# Patient Record
Sex: MALE | Race: WHITE | NOT HISPANIC OR LATINO | Employment: OTHER | URBAN - METROPOLITAN AREA
[De-identification: names, ages, dates, MRNs, and addresses within clinical notes are randomized per-mention and may not be internally consistent; named-entity substitution may affect disease eponyms.]

---

## 2017-03-23 PROBLEM — N50.811 RIGHT TESTICULAR PAIN: Status: ACTIVE | Noted: 2017-03-23

## 2019-05-08 DIAGNOSIS — G30.9 ALZHEIMER'S DISEASE: Primary | ICD-10-CM

## 2019-05-08 DIAGNOSIS — F02.80 ALZHEIMER'S DISEASE: Primary | ICD-10-CM

## 2019-05-09 ENCOUNTER — TELEPHONE (OUTPATIENT)
Dept: RADIOLOGY | Facility: HOSPITAL | Age: 69
End: 2019-05-09

## 2019-05-14 ENCOUNTER — HOSPITAL ENCOUNTER (OUTPATIENT)
Dept: RADIOLOGY | Facility: HOSPITAL | Age: 69
Discharge: HOME | End: 2019-05-14
Attending: NURSE PRACTITIONER
Payer: MEDICARE

## 2019-05-14 DIAGNOSIS — G30.9 ALZHEIMER'S DISEASE: ICD-10-CM

## 2019-05-14 DIAGNOSIS — F02.80 ALZHEIMER'S DISEASE: ICD-10-CM

## 2019-05-14 LAB — POCT GLUCOSE: 97 MG/DL (ref 70–110)

## 2019-05-14 PROCEDURE — 78608 BRAIN IMAGING (PET): CPT | Mod: 26,,, | Performed by: RADIOLOGY

## 2019-05-14 PROCEDURE — 78608 BRAIN IMAGING (PET): CPT | Mod: TC

## 2019-05-14 PROCEDURE — 78608 NM PET BRAIN: ICD-10-PCS | Mod: 26,,, | Performed by: RADIOLOGY

## 2019-11-25 ENCOUNTER — OFFICE VISIT (OUTPATIENT)
Dept: URGENT CARE | Facility: CLINIC | Age: 69
End: 2019-11-25
Payer: MEDICARE

## 2019-11-25 VITALS
HEIGHT: 68 IN | WEIGHT: 172 LBS | TEMPERATURE: 97 F | HEART RATE: 84 BPM | SYSTOLIC BLOOD PRESSURE: 144 MMHG | BODY MASS INDEX: 26.07 KG/M2 | OXYGEN SATURATION: 98 % | DIASTOLIC BLOOD PRESSURE: 89 MMHG

## 2019-11-25 DIAGNOSIS — S90.32XA CONTUSION OF LEFT FOOT, INITIAL ENCOUNTER: Primary | ICD-10-CM

## 2019-11-25 PROCEDURE — 73630 X-RAY EXAM OF FOOT: CPT | Mod: LT,S$GLB,, | Performed by: RADIOLOGY

## 2019-11-25 PROCEDURE — 99204 PR OFFICE/OUTPT VISIT, NEW, LEVL IV, 45-59 MIN: ICD-10-PCS | Mod: S$GLB,,, | Performed by: FAMILY MEDICINE

## 2019-11-25 PROCEDURE — 99204 OFFICE O/P NEW MOD 45 MIN: CPT | Mod: S$GLB,,, | Performed by: FAMILY MEDICINE

## 2019-11-25 PROCEDURE — 1159F PR MEDICATION LIST DOCUMENTED IN MEDICAL RECORD: ICD-10-PCS | Mod: S$GLB,,, | Performed by: FAMILY MEDICINE

## 2019-11-25 PROCEDURE — 73630 XR FOOT COMPLETE 3 VIEW LEFT: ICD-10-PCS | Mod: LT,S$GLB,, | Performed by: RADIOLOGY

## 2019-11-25 PROCEDURE — 1159F MED LIST DOCD IN RCRD: CPT | Mod: S$GLB,,, | Performed by: FAMILY MEDICINE

## 2019-11-25 RX ORDER — ETODOLAC 400 MG/1
TABLET, FILM COATED ORAL
Refills: 0 | COMMUNITY
Start: 2019-10-19 | End: 2022-07-14 | Stop reason: ALTCHOICE

## 2019-11-25 RX ORDER — DONEPEZIL HYDROCHLORIDE 10 MG/1
TABLET, FILM COATED ORAL
Refills: 2 | COMMUNITY
Start: 2019-09-23 | End: 2022-12-07 | Stop reason: SDUPTHER

## 2019-11-25 RX ORDER — MUPIROCIN 20 MG/G
OINTMENT TOPICAL 2 TIMES DAILY
Qty: 30 G | Refills: 0 | Status: SHIPPED | OUTPATIENT
Start: 2019-11-25 | End: 2024-02-15

## 2019-11-25 RX ORDER — MELOXICAM 7.5 MG/1
7.5 TABLET ORAL DAILY
Qty: 20 TABLET | Refills: 0 | Status: SHIPPED | OUTPATIENT
Start: 2019-11-25 | End: 2019-12-15

## 2019-11-25 RX ORDER — MEMANTINE HYDROCHLORIDE 10 MG/1
TABLET ORAL
Refills: 5 | COMMUNITY
Start: 2019-09-23 | End: 2022-12-07 | Stop reason: SDUPTHER

## 2019-11-25 NOTE — PROGRESS NOTES
"Subjective:       Patient ID: Ramiro Baxter is a 69 y.o. male.    Vitals:  height is 5' 8" (1.727 m) and weight is 78 kg (172 lb). His oral temperature is 97.2 °F (36.2 °C). His blood pressure is 144/89 (abnormal) and his pulse is 84. His oxygen saturation is 98%.     Chief Complaint: Foot Injury    Foot Injury    Incident onset: 4 days ago  The incident occurred at home. The injury mechanism was a direct blow. The pain is present in the left foot. The quality of the pain is described as burning and aching. The pain is mild. Pertinent negatives include no inability to bear weight, numbness or tingling. He reports no foreign bodies present. The symptoms are aggravated by weight bearing and palpation.       Constitution: Negative for chills, fatigue and fever.   HENT: Negative for congestion and sore throat.    Neck: Negative for painful lymph nodes.   Cardiovascular: Negative for chest pain and leg swelling.   Eyes: Negative for double vision and blurred vision.   Respiratory: Negative for cough and shortness of breath.    Gastrointestinal: Negative for nausea, vomiting and diarrhea.   Genitourinary: Negative for dysuria, frequency and urgency.   Musculoskeletal: Positive for pain, joint pain and joint swelling. Negative for muscle cramps and muscle ache.   Skin: Positive for color change and abrasion. Negative for pale, rash and wound.   Allergic/Immunologic: Negative for seasonal allergies.   Neurological: Negative for dizziness, history of vertigo, light-headedness, passing out, headaches and numbness.   Hematologic/Lymphatic: Negative for swollen lymph nodes, easy bruising/bleeding and history of blood clots. Does not bruise/bleed easily.   Psychiatric/Behavioral: Negative for nervous/anxious, sleep disturbance and depression. The patient is not nervous/anxious.        Objective:      Physical Exam   Constitutional: He is oriented to person, place, and time. He appears well-developed and well-nourished. He " is cooperative.  Non-toxic appearance. He does not appear ill. No distress.   HENT:   Head: Normocephalic and atraumatic. Head is without abrasion, without contusion and without laceration.   Right Ear: Hearing, tympanic membrane, external ear and ear canal normal. No hemotympanum.   Left Ear: Hearing, tympanic membrane, external ear and ear canal normal. No hemotympanum.   Nose: Nose normal. No mucosal edema, rhinorrhea or nasal deformity. No epistaxis. Right sinus exhibits no maxillary sinus tenderness and no frontal sinus tenderness. Left sinus exhibits no maxillary sinus tenderness and no frontal sinus tenderness.   Mouth/Throat: Uvula is midline, oropharynx is clear and moist and mucous membranes are normal. No trismus in the jaw. Normal dentition. No uvula swelling. No posterior oropharyngeal erythema.   Eyes: Pupils are equal, round, and reactive to light. Conjunctivae, EOM and lids are normal. Right eye exhibits no discharge. Left eye exhibits no discharge. No scleral icterus.   Neck: Trachea normal, normal range of motion, full passive range of motion without pain and phonation normal. Neck supple. No spinous process tenderness and no muscular tenderness present. No neck rigidity. No tracheal deviation present.   Cardiovascular: Normal rate, regular rhythm, normal heart sounds, intact distal pulses and normal pulses.   Pulmonary/Chest: Effort normal and breath sounds normal. No respiratory distress.   Abdominal: Soft. Normal appearance and bowel sounds are normal. He exhibits no distension, no pulsatile midline mass and no mass. There is no tenderness.   Musculoskeletal: He exhibits no edema or deformity.        Left foot: There is decreased range of motion and tenderness.        Feet:    Neurological: He is alert and oriented to person, place, and time. He has normal strength. No cranial nerve deficit or sensory deficit. He exhibits normal muscle tone. He displays no seizure activity. Coordination normal.  GCS eye subscore is 4. GCS verbal subscore is 5. GCS motor subscore is 6.   Skin: Skin is warm, dry, intact, not diaphoretic and not pale. Capillary refill takes less than 2 seconds. abrasion, burn, bruising and ecchymosis  Psychiatric: He has a normal mood and affect. His speech is normal and behavior is normal. Judgment and thought content normal. Cognition and memory are normal.   Nursing note and vitals reviewed.   Type of Interpretation: ED Physician (Independently Interpreted).  Radiology Procedure Done: Left Foot.  Interpretation: No fx seen.            Assessment:       1. Contusion of left foot, initial encounter        Plan:         Contusion of left foot, initial encounter  -     X-Ray Foot Complete Left; Future; Expected date: 11/25/2019  -     mupirocin (BACTROBAN) 2 % ointment; Apply topically 2 (two) times daily.  Dispense: 30 g; Refill: 0  -     meloxicam (MOBIC) 7.5 MG tablet; Take 1 tablet (7.5 mg total) by mouth once daily. for 20 days  Dispense: 20 tablet; Refill: 0     Please drink plenty of fluids.  Please get plenty of rest.  Please return here or go to the Emergency Department for any concerns or worsening of condition.  If you were prescribed a narcotic medication, do not drive or operate heavy equipment or machinery while taking these medications.  If you were not prescribed an anti-inflammatory medication, and if you do not have any history of stomach/intestinal ulcers, or kidney disease, or are not taking a blood thinner such as Coumadin, Plavix, Pradaxa, Eloquis, or Xaralta for example, it is OK to take over the counter Ibuprofen or Advil or Motrin or Aleve as directed.  Do not take these medications on an empty stomach.  Rest, ice, compression and elevation to the affected joint or limb as needed.    If you were given a sling, wear it for comfort until follow up as arranged.  If you were given or placed in a splint, wear it until your follow up visit or recheck.  If you  smoke, please  stop smoking.       Please follow up with your primary care doctor or specialist as needed.    Jose Foley MD  [phone]    You must understand that you have received treatment at an Urgent Care facility only, and that you may be  released before all of your medical problems are known or treated. Urgent Care facilities are not equipped to  handle life threatening emergencies. It is recommended that you seek care at an Emergency Department for  further evaluation of worsening or concerning symptoms, or possibly life threatening conditions as  discussed.

## 2019-11-25 NOTE — PATIENT INSTRUCTIONS
Please drink plenty of fluids.  Please get plenty of rest.  Please return here or go to the Emergency Department for any concerns or worsening of condition.  If you were prescribed a narcotic medication, do not drive or operate heavy equipment or machinery while taking these medications.  If you were not prescribed an anti-inflammatory medication, and if you do not have any history of stomach/intestinal ulcers, or kidney disease, or are not taking a blood thinner such as Coumadin, Plavix, Pradaxa, Eloquis, or Xaralta for example, it is OK to take over the counter Ibuprofen or Advil or Motrin or Aleve as directed.  Do not take these medications on an empty stomach.  Rest, ice, compression and elevation to the affected joint or limb as needed.    If you were given a sling, wear it for comfort until follow up as arranged.  If you were given or placed in a splint, wear it until your follow up visit or recheck.  If you  smoke, please stop smoking.       Please follow up with your primary care doctor or specialist as needed.    Jose Foley MD  [phone]    You must understand that you have received treatment at an Urgent Care facility only, and that you may be  released before all of your medical problems are known or treated. Urgent Care facilities are not equipped to  handle life threatening emergencies. It is recommended that you seek care at an Emergency Department for  further evaluation of worsening or concerning symptoms, or possibly life threatening conditions as  discussed.      Foot Contusion  You have a contusion. This is also called a bruise. There is swelling and some bleeding under the skin, but no broken bones. This injury generally takes a few days to a few weeks to heal.  During that time, the bruise will typically change in color from reddish, to purple-blue, to greenish-yellow, then to yellow-brown.  Home care  · Elevate the foot to reduce pain and swelling. As much as possible, sit or lie down  with the foot raised about the level of your heart. This is especially important during the first 48 hours.  · Ice the foot to help reduce pain and swelling. Wrap a cold source (ice pack or ice cubes in a plastic bag) in a thin towel. Apply to the bruised area for 20 minutes every 1 to 2 hours the first day. Continue this 3 to 4 times a day until the pain and swelling goes away.  · Unless another medicine was prescribed, you can take acetaminophen, ibuprofen, or naproxen to control pain. (If you have chronic liver or kidney disease or ever had a stomach ulcer or gastrointestinal bleeding, talk with your healthcare provider before using these medicines.)  Follow up  Follow up with your healthcare provider or our staff as advised. Call if you are not improving within 1 to 2 weeks.  When to seek medical advice   Call your healthcare provider right away if you have any of the following:  · Increased pain or swelling  · Foot or leg becomes cold, blue, numb or tingly  · Signs of infection: Warmth, drainage, or increased redness or pain around the bruise  · Inability to move the injured foot   · Frequent bruising for unknown reasons  Date Last Reviewed: 2/1/2017  © 1665-7980 [employer]. [address] [place] [address], [place] [address]. All rights reserved. This information is not intended as a substitute for professional medical care. Always follow your healthcare professional's instructions.

## 2022-07-14 PROBLEM — F10.27 DEMENTIA ASSOCIATED WITH ALCOHOLISM WITH BEHAVIORAL DISTURBANCE: Status: ACTIVE | Noted: 2022-07-14

## 2022-07-14 PROBLEM — E78.2 MIXED HYPERLIPIDEMIA: Status: ACTIVE | Noted: 2022-07-14

## 2022-07-14 PROBLEM — I10 PRIMARY HYPERTENSION: Status: ACTIVE | Noted: 2022-07-14

## 2024-01-29 ENCOUNTER — HOSPITAL ENCOUNTER (OUTPATIENT)
Dept: RADIOLOGY | Facility: HOSPITAL | Age: 74
Discharge: HOME | End: 2024-01-29
Attending: UROLOGY
Payer: MEDICARE

## 2024-01-29 DIAGNOSIS — R97.20 ELEVATED PSA: ICD-10-CM

## 2024-01-29 DIAGNOSIS — R10.31 RIGHT LOWER QUADRANT PAIN: Primary | ICD-10-CM

## 2024-01-29 PROCEDURE — 74177 CT ABD & PELVIS W/CONTRAST: CPT | Mod: 26,,, | Performed by: RADIOLOGY

## 2024-01-29 PROCEDURE — 74177 CT ABD & PELVIS W/CONTRAST: CPT | Mod: TC

## 2024-01-29 PROCEDURE — 25500020 PHARM REV CODE 255: Performed by: UROLOGY

## 2024-01-29 RX ADMIN — IOHEXOL 30 ML: 350 INJECTION, SOLUTION INTRAVENOUS at 06:01

## 2024-01-29 RX ADMIN — IOHEXOL 75 ML: 350 INJECTION, SOLUTION INTRAVENOUS at 06:01

## 2024-03-01 PROBLEM — G62.1 ALCOHOLIC POLYNEUROPATHY: Status: ACTIVE | Noted: 2024-03-01

## 2024-03-01 PROBLEM — G30.9 ALZHEIMER'S DISEASE, UNSPECIFIED (CODE): Status: ACTIVE | Noted: 2024-03-01

## 2024-04-15 PROBLEM — N18.31 CHRONIC KIDNEY DISEASE, STAGE 3A: Status: ACTIVE | Noted: 2024-04-15
